# Patient Record
Sex: FEMALE | Race: WHITE
[De-identification: names, ages, dates, MRNs, and addresses within clinical notes are randomized per-mention and may not be internally consistent; named-entity substitution may affect disease eponyms.]

---

## 2018-11-30 ENCOUNTER — HOSPITAL ENCOUNTER (OUTPATIENT)
Dept: HOSPITAL 92 - BICMAMMO | Age: 73
Discharge: HOME | End: 2018-11-30
Attending: INTERNAL MEDICINE
Payer: MEDICARE

## 2018-11-30 DIAGNOSIS — Z12.31: Primary | ICD-10-CM

## 2018-11-30 PROCEDURE — 77063 BREAST TOMOSYNTHESIS BI: CPT

## 2018-11-30 PROCEDURE — 77067 SCR MAMMO BI INCL CAD: CPT

## 2019-07-09 ENCOUNTER — HOSPITAL ENCOUNTER (OUTPATIENT)
Dept: HOSPITAL 92 - BICULT | Age: 74
Discharge: HOME | End: 2019-07-09
Attending: INTERNAL MEDICINE
Payer: MEDICARE

## 2019-07-09 DIAGNOSIS — N28.1: ICD-10-CM

## 2019-07-09 DIAGNOSIS — R10.11: ICD-10-CM

## 2019-07-09 DIAGNOSIS — R10.13: Primary | ICD-10-CM

## 2019-07-09 DIAGNOSIS — R10.32: ICD-10-CM

## 2019-07-09 DIAGNOSIS — R10.31: ICD-10-CM

## 2019-07-09 PROCEDURE — 76700 US EXAM ABDOM COMPLETE: CPT

## 2019-07-09 NOTE — ULT
ULTRASOUND ABDOMEN COMPLETE:



DATE:

7/9/2019



HISTORY:

73-year-old female with right upper quadrant and mid epigastric abdominal pain.



FINDINGS:

Gallbladder: Normal wall thickness. No gallstones or sludge identified. No pericholecystic fluid.

Liver: Normal parenchymal echogenicity.

Bilateral kidneys: No hydronephrosis. 0.8 cm exophytic cortical cyst right midpole. 1.5 cm exophytic 
cortical cyst left renal upper midpole.

Pancreas: Nonspecific sonographic appearance.

Common duct caliber: 4 mm.

Abdominal aorta: No aneurysm

Inferior vena cava: Unremarkable where visualized.

Spleen: No splenomegaly



IMPRESSION:



1. Bilateral renal cysts, at least one on each side.

2. Otherwise normal.



Reported By: Dimas Rowley 

Electronically Signed:  7/9/2019 10:02 AM